# Patient Record
Sex: FEMALE | Race: WHITE | NOT HISPANIC OR LATINO | Employment: OTHER | URBAN - NONMETROPOLITAN AREA
[De-identification: names, ages, dates, MRNs, and addresses within clinical notes are randomized per-mention and may not be internally consistent; named-entity substitution may affect disease eponyms.]

---

## 2017-02-13 RX ORDER — DIGOXIN 125 MCG
TABLET ORAL
Qty: 30 TABLET | Refills: 0 | Status: SHIPPED | OUTPATIENT
Start: 2017-02-13

## 2017-03-21 ENCOUNTER — OFFICE VISIT (OUTPATIENT)
Dept: CARDIOLOGY | Facility: CLINIC | Age: 82
End: 2017-03-21

## 2017-03-21 VITALS
HEIGHT: 68 IN | BODY MASS INDEX: 20 KG/M2 | WEIGHT: 132 LBS | HEART RATE: 67 BPM | DIASTOLIC BLOOD PRESSURE: 62 MMHG | SYSTOLIC BLOOD PRESSURE: 130 MMHG

## 2017-03-21 DIAGNOSIS — I50.20 SYSTOLIC CONGESTIVE HEART FAILURE, UNSPECIFIED CONGESTIVE HEART FAILURE CHRONICITY: ICD-10-CM

## 2017-03-21 DIAGNOSIS — Z79.01 ANTICOAGULATED: ICD-10-CM

## 2017-03-21 DIAGNOSIS — I48.20 CHRONIC ATRIAL FIBRILLATION (HCC): Primary | ICD-10-CM

## 2017-03-21 PROCEDURE — 93000 ELECTROCARDIOGRAM COMPLETE: CPT | Performed by: INTERNAL MEDICINE

## 2017-03-21 PROCEDURE — 99213 OFFICE O/P EST LOW 20 MIN: CPT | Performed by: INTERNAL MEDICINE

## 2017-03-21 NOTE — PROGRESS NOTES
" Subjective:       Jeaneth Hopkins is a 89 y.o. female who here for follow up    CC  Follow up for afib  HPI  89 yr old w/f here for follow up with chronic afib, on anticoagulation has been doing well, with no cp , palpitation, syncope near syncope     Problem List Items Addressed This Visit        Cardiovascular and Mediastinum    Systolic congestive heart failure    Chronic atrial fibrillation - Primary       Other    Anticoagulated        Previous treatments/evaluations include: ASA. Cardiac risk factors: advanced age (older than 55 for men, 65 for women).    The following portions of the patient's history were reviewed and updated as appropriate: allergies, current medications, past family history, past medical history, past social history, past surgical history and problem list.    Past Medical History   Diagnosis Date   • Abnormal TSH    • Allergic rhinitis    • Anxiety    • Arthritis    • Atrial fibrillation    • Basal cell carcinoma    • DJD (degenerative joint disease)    • GERD (gastroesophageal reflux disease)    • Hearing loss    • Heart murmur    • Hiatal hernia    • HTN (hypertension)    • Pulmonary HTN     reports that she quit smoking about 80 years ago. She has never used smokeless tobacco. She reports that she does not drink alcohol or use illicit drugs.  Family History   Problem Relation Age of Onset   • Heart attack Father        Review of Systems  Constitutional: No wt loss, fever, fatigue  Gastrointestinal: No nausea, abdominal pain  Behavioral/Psych: No insomnia or anxiety   Cardiovascular no cp, palpitation  Objective:       Physical Exam             Physical Exam  Visit Vitals   • /62   • Pulse 67   • Ht 68\" (172.7 cm)   • Wt 132 lb (59.9 kg)   • BMI 20.07 kg/m2       General appearance: NAD, conversant   Eyes: anicteric sclerae, moist conjunctivae; no lid-lag; PERRLA   HENT: Atraumatic; oropharynx clear with moist mucous membranes and no mucosal ulcerations;  normal hard and soft " palate   Neck: Trachea midline; FROM, supple, no thyromegaly or lymphadenopathy   Lungs: CTA, with normal respiratory effort and no intercostal retractions   CV: S1-S2 regular, no murmurs, no rub, no gallop   Abdomen: Soft, non-tender; no masses or HSM   Extremities: No peripheral edema or extremity lymphadenopathy  Skin: Normal temperature, turgor and texture; no rash, ulcers or subcutaneous nodules   Psych: Appropriate affect, alert and oriented to person, place and time           Cardiographics  @  ECG 12 Lead  Date/Time: 3/21/2017 2:54 PM  Performed by: SELVIN COUCH  Authorized by: SELVIN COUCH   Comparison: compared with previous ECG   Similar to previous ECG  Rhythm: atrial fibrillation  ST Flattening: all  Clinical impression: abnormal ECG        Interpretation Summary   · Equivocal ECG evidence of myocardial ischemia.Negative clinical evidence of myocardial ischemia.  · Left ventricular ejection fraction is hyperdynamic (Calculated EF > 70%).  · Myocardial perfusion imaging indicates a normal myocardial perfusion study with no evidence of ischemia.  · Impressions are consistent with a low risk study.             Echocardiogram:        Current Outpatient Prescriptions:   •  ALPRAZolam (XANAX) 0.25 MG tablet, Take 0.25 mg by mouth At Night As Needed., Disp: , Rfl:   •  carvedilol (COREG) 6.25 MG tablet, Take 6.25 mg by mouth 2 (Two) Times a Day With Meals., Disp: , Rfl:   •  digoxin (LANOXIN) 125 MCG tablet, TAKE ONE TABLET BY MOUTH ONCE DAILY, Disp: 30 tablet, Rfl: 0  •  famotidine (PEPCID) 20 MG tablet, Take 1 tablet by mouth Daily., Disp: , Rfl:   •  fluticasone (FLONASE) 50 MCG/ACT nasal spray, 1 spray into each nostril Daily As Needed., Disp: , Rfl:   •  furosemide (LASIX) 20 MG tablet, Take 20 mg by mouth Daily., Disp: , Rfl:   •  mineral oil 100 % oil external liquid, Apply  topically As Needed., Disp: , Rfl:   •  nitroglycerin (NITROSTAT) 0.4 MG SL tablet, Place 1 tablet under the  tongue Every 5 (Five) Minutes As Needed for chest pain. Take no more than 3 doses in 15 minutes., Disp: 25 tablet, Rfl: 0  •  rivaroxaban (XARELTO) 20 MG tablet, Take 20 mg by mouth Daily With Dinner., Disp: , Rfl:    Assessment:        Patient Active Problem List   Diagnosis   • Paroxysmal atrial fibrillation   • Combined systolic and diastolic congestive heart failure   • New onset a-fib   • Systolic congestive heart failure   • Anticoagulated   • Chronic atrial fibrillation               Plan:       Pros and cons as well as indication of the anticoagulation has been explained to the patient in detail    There are no obvious complications at this stage    Risk of  the bleedings has been explained    Need for the regular blood workup and adjust the dose has been explained    Need for proper follow-up on anticoagulation also has been explained        ICD-10-CM ICD-9-CM   1. Chronic atrial fibrillation I48.2 427.31   2. Anticoagulated Z79.01 V58.61   3. Systolic congestive heart failure, unspecified congestive heart failure chronicity I50.20 428.20     428.0   sss stable    See us 6 months    COUNSELING:    Jeaneth Benítez was given to patient for the following topics: diagnostic results, risk factor reductions, impressions, risks and benefits of treatment options and importance of treatment compliance .       SMOKING COUNSELING:    Counseling given: Not Answered      EMR Dragon/Transcription disclaimer:   Much of this encounter note is an electronic transcription/translation of spoken language to printed text. The electronic translation of spoken language may permit erroneous, or at times, nonsensical words or phrases to be inadvertently transcribed; Although I have reviewed the note for such errors, some may still exist.

## 2017-09-26 ENCOUNTER — OFFICE VISIT (OUTPATIENT)
Dept: CARDIOLOGY | Facility: CLINIC | Age: 82
End: 2017-09-26

## 2017-09-26 VITALS
HEIGHT: 68 IN | HEART RATE: 94 BPM | SYSTOLIC BLOOD PRESSURE: 123 MMHG | WEIGHT: 124 LBS | BODY MASS INDEX: 18.79 KG/M2 | DIASTOLIC BLOOD PRESSURE: 64 MMHG

## 2017-09-26 DIAGNOSIS — I50.40 COMBINED SYSTOLIC AND DIASTOLIC CONGESTIVE HEART FAILURE, UNSPECIFIED CONGESTIVE HEART FAILURE CHRONICITY: ICD-10-CM

## 2017-09-26 DIAGNOSIS — I48.0 PAROXYSMAL ATRIAL FIBRILLATION (HCC): Primary | ICD-10-CM

## 2017-09-26 DIAGNOSIS — I50.20 SYSTOLIC CONGESTIVE HEART FAILURE, UNSPECIFIED CONGESTIVE HEART FAILURE CHRONICITY: ICD-10-CM

## 2017-09-26 DIAGNOSIS — I48.20 CHRONIC ATRIAL FIBRILLATION (HCC): ICD-10-CM

## 2017-09-26 PROCEDURE — 99213 OFFICE O/P EST LOW 20 MIN: CPT | Performed by: INTERNAL MEDICINE

## 2017-09-26 PROCEDURE — 93000 ELECTROCARDIOGRAM COMPLETE: CPT | Performed by: INTERNAL MEDICINE

## 2017-09-26 NOTE — PROGRESS NOTES
" Subjective:       Jeaneth Hopkins is a 89 y.o. female who here for follow up    CC  6 months follow-up  HPI  89-year-old white female with known history of paroxysmal atrial fibrillation congestive heart failure here for the six-month follow-up with no complaints of chest pains or tightness in chest also has a history of benign essential arterial hypertension as well as pulmonary hypertension with no chest pains or shortness of breath on moderate exertion only no different than before     Problem List Items Addressed This Visit        Cardiovascular and Mediastinum    Paroxysmal atrial fibrillation - Primary    Combined systolic and diastolic congestive heart failure    Systolic congestive heart failure    Chronic atrial fibrillation        .    The following portions of the patient's history were reviewed and updated as appropriate: allergies, current medications, past family history, past medical history, past social history, past surgical history and problem list.    Past Medical History:   Diagnosis Date   • Abnormal TSH    • Allergic rhinitis    • Anxiety    • Arthritis    • Atrial fibrillation    • Basal cell carcinoma    • DJD (degenerative joint disease)    • GERD (gastroesophageal reflux disease)    • Hearing loss    • Heart murmur    • Hiatal hernia    • HTN (hypertension)    • Pulmonary HTN     reports that she quit smoking about 80 years ago. She has never used smokeless tobacco. She reports that she does not drink alcohol or use illicit drugs.  Family History   Problem Relation Age of Onset   • Heart attack Father        Review of Systems  Constitutional: No wt loss, fever, fatigue  Gastrointestinal: No nausea, abdominal pain  Behavioral/Psych: No insomnia or anxiety   Cardiovascular Shortness of breath on moderate exertion  Objective:       Physical Exam           Physical Exam  /64  Pulse 94  Ht 68\" (172.7 cm)  Wt 124 lb (56.2 kg)  BMI 18.85 kg/m2    General appearance: NAD, conversant "   Eyes: anicteric sclerae, moist conjunctivae; no lid-lag; PERRLA   HENT: Atraumatic; oropharynx clear with moist mucous membranes and no mucosal ulcerations;  normal hard and soft palate   Neck: Trachea midline; FROM, supple, no thyromegaly or lymphadenopathy   Lungs: CTA, with normal respiratory effort and no intercostal retractions   CV: S1-S2 regular, no murmurs, no rub, no gallop   Abdomen: Soft, non-tender; no masses or HSM   Extremities: No peripheral edema or extremity lymphadenopathy  Skin: Normal temperature, turgor and texture; no rash, ulcers or subcutaneous nodules   Psych: Appropriate affect, alert and oriented to person, place and time           Cardiographics  @  ECG 12 Lead  Date/Time: 9/26/2017 3:37 PM  Performed by: SELVIN COUCH  Authorized by: SELVIN COUCH   Comparison: compared with previous ECG   Similar to previous ECG  Rhythm: atrial fibrillation  ST Flattening: all  Clinical impression: non-specific ECG            Echocardiogram:        Current Outpatient Prescriptions:   •  ALPRAZolam (XANAX) 0.25 MG tablet, Take 0.25 mg by mouth At Night As Needed., Disp: , Rfl:   •  carvedilol (COREG) 6.25 MG tablet, Take 6.25 mg by mouth 2 (Two) Times a Day With Meals., Disp: , Rfl:   •  digoxin (LANOXIN) 125 MCG tablet, TAKE ONE TABLET BY MOUTH ONCE DAILY, Disp: 30 tablet, Rfl: 0  •  furosemide (LASIX) 20 MG tablet, Take 20 mg by mouth Daily., Disp: , Rfl:   •  rivaroxaban (XARELTO) 20 MG tablet, Take 20 mg by mouth Daily With Dinner., Disp: , Rfl:   •  famotidine (PEPCID) 20 MG tablet, Take 1 tablet by mouth Daily., Disp: , Rfl:   •  fluticasone (FLONASE) 50 MCG/ACT nasal spray, 1 spray into each nostril Daily As Needed., Disp: , Rfl:   •  mineral oil 100 % oil external liquid, Apply  topically As Needed., Disp: , Rfl:   •  nitroglycerin (NITROSTAT) 0.4 MG SL tablet, Place 1 tablet under the tongue Every 5 (Five) Minutes As Needed for chest pain. Take no more than 3 doses in 15  minutes., Disp: 25 tablet, Rfl: 0   Assessment:        Patient Active Problem List   Diagnosis   • Paroxysmal atrial fibrillation   • Combined systolic and diastolic congestive heart failure   • New onset a-fib   • Systolic congestive heart failure   • Anticoagulated   • Chronic atrial fibrillation               Plan:            ICD-10-CM ICD-9-CM   1. Paroxysmal atrial fibrillation I48.0 427.31   2. Combined systolic and diastolic congestive heart failure, unspecified congestive heart failure chronicity I50.40 428.40   3. Systolic congestive heart failure, unspecified congestive heart failure chronicity I50.20 428.20     428.0   4. Chronic atrial fibrillation I48.2 427.31     1. Paroxysmal atrial fibrillation  Atrial fibrillation undercontrolled    2. Combined systolic and diastolic congestive heart failure, unspecified congestive heart failure chronicity  Well compensated    3. Systolic congestive heart failure, unspecified congestive heart failure chronicity      4. Chronic atrial fibrillation      COUNSELING:    Jeaneth Benítez was given to patient for the following topics: diagnostic results, risk factor reductions, impressions, risks and benefits of treatment options and importance of treatment compliance .       SMOKING COUNSELING:    Counseling given: Not Answered      EMR Dragon/Transcription disclaimer:   Much of this encounter note is an electronic transcription/translation of spoken language to printed text. The electronic translation of spoken language may permit erroneous, or at times, nonsensical words or phrases to be inadvertently transcribed; Although I have reviewed the note for such errors, some may still exist.

## 2017-12-06 ENCOUNTER — APPOINTMENT (OUTPATIENT)
Dept: CT IMAGING | Facility: HOSPITAL | Age: 82
End: 2017-12-06

## 2017-12-06 ENCOUNTER — HOSPITAL ENCOUNTER (EMERGENCY)
Facility: HOSPITAL | Age: 82
Discharge: HOME | End: 2017-12-07
Attending: EMERGENCY MEDICINE | Admitting: EMERGENCY MEDICINE

## 2017-12-06 DIAGNOSIS — H61.23 BILATERAL IMPACTED CERUMEN: ICD-10-CM

## 2017-12-06 DIAGNOSIS — IMO0002 POSITIONAL VERTIGO OF LEFT EAR: ICD-10-CM

## 2017-12-06 DIAGNOSIS — R11.0 NAUSEA: Primary | ICD-10-CM

## 2017-12-06 DIAGNOSIS — R53.1 GENERALIZED WEAKNESS: ICD-10-CM

## 2017-12-06 LAB
ALBUMIN SERPL-MCNC: 3.9 G/DL (ref 3.5–5.2)
ALBUMIN/GLOB SERPL: 1.3 G/DL
ALP SERPL-CCNC: 65 U/L (ref 40–129)
ALT SERPL W P-5'-P-CCNC: 12 U/L (ref 5–33)
ANION GAP SERPL CALCULATED.3IONS-SCNC: 10.8 MMOL/L
AST SERPL-CCNC: 18 U/L (ref 5–32)
BASOPHILS # BLD AUTO: 0.02 10*3/MM3 (ref 0–0.2)
BASOPHILS NFR BLD AUTO: 0.4 % (ref 0–2)
BILIRUB SERPL-MCNC: 0.4 MG/DL (ref 0.2–1.2)
BILIRUB UR QL STRIP: NEGATIVE
BUN BLD-MCNC: 33 MG/DL (ref 8–23)
BUN/CREAT SERPL: 35.5 (ref 7–25)
CALCIUM SPEC-SCNC: 8.5 MG/DL (ref 8.2–9.6)
CHLORIDE SERPL-SCNC: 103 MMOL/L (ref 98–107)
CLARITY UR: CLEAR
CO2 SERPL-SCNC: 25.2 MMOL/L (ref 22–29)
COLOR UR: YELLOW
CREAT BLD-MCNC: 0.93 MG/DL (ref 0.57–1)
DEPRECATED RDW RBC AUTO: 47.1 FL (ref 37–54)
DIGOXIN SERPL-MCNC: 1.51 NG/ML (ref 0.8–2)
EOSINOPHIL # BLD AUTO: 0.04 10*3/MM3 (ref 0.1–0.3)
EOSINOPHIL NFR BLD AUTO: 0.7 % (ref 0–4)
ERYTHROCYTE [DISTWIDTH] IN BLOOD BY AUTOMATED COUNT: 14.6 % (ref 11.5–14.5)
GFR SERPL CREATININE-BSD FRML MDRD: 57 ML/MIN/1.73
GLOBULIN UR ELPH-MCNC: 3 GM/DL
GLUCOSE BLD-MCNC: 172 MG/DL (ref 65–99)
GLUCOSE UR STRIP-MCNC: NEGATIVE MG/DL
HCT VFR BLD AUTO: 34 % (ref 37–47)
HGB BLD-MCNC: 11 G/DL (ref 12–16)
HGB UR QL STRIP.AUTO: NEGATIVE
IMM GRANULOCYTES # BLD: 0.01 10*3/MM3 (ref 0–0.03)
IMM GRANULOCYTES NFR BLD: 0.2 % (ref 0–0.5)
KETONES UR QL STRIP: NEGATIVE
LEUKOCYTE ESTERASE UR QL STRIP.AUTO: NEGATIVE
LIPASE SERPL-CCNC: 32 U/L (ref 13–60)
LYMPHOCYTES # BLD AUTO: 1.24 10*3/MM3 (ref 0.6–4.8)
LYMPHOCYTES NFR BLD AUTO: 22.6 % (ref 20–45)
MCH RBC QN AUTO: 28.8 PG (ref 27–31)
MCHC RBC AUTO-ENTMCNC: 32.4 G/DL (ref 31–37)
MCV RBC AUTO: 89 FL (ref 81–99)
MONOCYTES # BLD AUTO: 0.35 10*3/MM3 (ref 0–1)
MONOCYTES NFR BLD AUTO: 6.4 % (ref 3–8)
NEUTROPHILS # BLD AUTO: 3.83 10*3/MM3 (ref 1.5–8.3)
NEUTROPHILS NFR BLD AUTO: 69.7 % (ref 45–70)
NITRITE UR QL STRIP: NEGATIVE
NRBC BLD MANUAL-RTO: 0 /100 WBC (ref 0–0)
PH UR STRIP.AUTO: 7 [PH] (ref 4.5–8)
PLATELET # BLD AUTO: 119 10*3/MM3 (ref 140–500)
PMV BLD AUTO: 11.3 FL (ref 7.4–10.4)
POTASSIUM BLD-SCNC: 4.6 MMOL/L (ref 3.5–5.2)
PROT SERPL-MCNC: 6.9 G/DL (ref 6–8.5)
PROT UR QL STRIP: NEGATIVE
RBC # BLD AUTO: 3.82 10*6/MM3 (ref 4.2–5.4)
SODIUM BLD-SCNC: 139 MMOL/L (ref 136–145)
SP GR UR STRIP: 1.02 (ref 1–1.03)
TROPONIN T SERPL-MCNC: <0.01 NG/ML (ref 0–0.03)
UROBILINOGEN UR QL STRIP: NORMAL
WBC NRBC COR # BLD: 5.49 10*3/MM3 (ref 4.8–10.8)

## 2017-12-06 PROCEDURE — 99284 EMERGENCY DEPT VISIT MOD MDM: CPT

## 2017-12-06 PROCEDURE — 83690 ASSAY OF LIPASE: CPT | Performed by: EMERGENCY MEDICINE

## 2017-12-06 PROCEDURE — 63710000001 DEXAMETHASONE PER 0.25 MG: Performed by: EMERGENCY MEDICINE

## 2017-12-06 PROCEDURE — 93010 ELECTROCARDIOGRAM REPORT: CPT | Performed by: INTERNAL MEDICINE

## 2017-12-06 PROCEDURE — 85025 COMPLETE CBC W/AUTO DIFF WBC: CPT | Performed by: EMERGENCY MEDICINE

## 2017-12-06 PROCEDURE — 80162 ASSAY OF DIGOXIN TOTAL: CPT | Performed by: EMERGENCY MEDICINE

## 2017-12-06 PROCEDURE — 84484 ASSAY OF TROPONIN QUANT: CPT | Performed by: EMERGENCY MEDICINE

## 2017-12-06 PROCEDURE — 80053 COMPREHEN METABOLIC PANEL: CPT | Performed by: EMERGENCY MEDICINE

## 2017-12-06 PROCEDURE — 99285 EMERGENCY DEPT VISIT HI MDM: CPT | Performed by: EMERGENCY MEDICINE

## 2017-12-06 PROCEDURE — 81003 URINALYSIS AUTO W/O SCOPE: CPT | Performed by: EMERGENCY MEDICINE

## 2017-12-06 PROCEDURE — 70450 CT HEAD/BRAIN W/O DYE: CPT

## 2017-12-06 PROCEDURE — 93005 ELECTROCARDIOGRAM TRACING: CPT | Performed by: EMERGENCY MEDICINE

## 2017-12-06 RX ORDER — MECLIZINE HYDROCHLORIDE 25 MG/1
TABLET ORAL
Qty: 30 TABLET | Refills: 0 | Status: SHIPPED | OUTPATIENT
Start: 2017-12-06 | End: 2018-03-27 | Stop reason: ALTCHOICE

## 2017-12-06 RX ORDER — DEXAMETHASONE 4 MG/1
8 TABLET ORAL ONCE
Status: COMPLETED | OUTPATIENT
Start: 2017-12-06 | End: 2017-12-06

## 2017-12-06 RX ORDER — SODIUM CHLORIDE 0.9 % (FLUSH) 0.9 %
10 SYRINGE (ML) INJECTION AS NEEDED
Status: DISCONTINUED | OUTPATIENT
Start: 2017-12-06 | End: 2017-12-07 | Stop reason: HOSPADM

## 2017-12-06 RX ORDER — ONDANSETRON 4 MG/1
TABLET, ORALLY DISINTEGRATING ORAL
Qty: 20 TABLET | Refills: 0 | Status: SHIPPED | OUTPATIENT
Start: 2017-12-06 | End: 2018-03-27 | Stop reason: ALTCHOICE

## 2017-12-06 RX ORDER — SODIUM CHLORIDE 0.9 % (FLUSH) 0.9 %
10 SYRINGE (ML) INJECTION AS NEEDED
Status: DISCONTINUED | OUTPATIENT
Start: 2017-12-06 | End: 2017-12-06

## 2017-12-06 RX ADMIN — DEXAMETHASONE 8 MG: 4 TABLET ORAL at 23:54

## 2017-12-07 VITALS
WEIGHT: 127.21 LBS | BODY MASS INDEX: 21.19 KG/M2 | SYSTOLIC BLOOD PRESSURE: 141 MMHG | HEART RATE: 80 BPM | RESPIRATION RATE: 22 BRPM | DIASTOLIC BLOOD PRESSURE: 75 MMHG | TEMPERATURE: 97.7 F | OXYGEN SATURATION: 95 % | HEIGHT: 65 IN

## 2017-12-07 NOTE — ED NOTES
Sat pt up in bed to take pills.  Pt's dizziness worsened when she was moved.      Dorothea Valladares RN  12/06/17 0346

## 2017-12-07 NOTE — ED PROVIDER NOTES
Subjective   History of Present Illness   Family assist with history, especially her granddaughter.  Patient is somewhat sedated after the Phenergan by EMS.    History of Present Illness    Chief complaint: Nausea    Location: No focal pain complaint    Quality/Severity:  Moderate at onset, none now    Timing/Duration: Transient episode yesterday morning at 3 AM that resolved spontaneously; recurrence of nausea about 2 hours prior to arrival in the emergency department    Modifying Factors: Nausea significant improved after fainting in by EMS    Associated Symptoms: One episode of formed stool shortly prior to arrival at home before EMS arrival.  No vomiting.  No fever.  No dysuria.  No change in bowel habits.  Denies abdominal pain.  Denies chest pain, dyspnea and headache.    Narrative: 89-year-old female with 2 episodes of nausea.  Family reports that urine was strong smelling.  Patient reports she felt well earlier in the day today.  No recent fevers.    Review of Systems  All other systems reviewed and are otherwise negative as related to chief complaint.    Past Medical History:   Diagnosis Date   • Abnormal TSH    • Allergic rhinitis    • Anxiety    • Arthritis    • Atrial fibrillation    • Basal cell carcinoma    • DJD (degenerative joint disease)    • GERD (gastroesophageal reflux disease)    • Hearing loss    • Heart murmur    • Hiatal hernia    • HTN (hypertension)    • Pulmonary HTN        Allergies   Allergen Reactions   • Codeine    • Zyrtec [Cetirizine]        Past Surgical History:   Procedure Laterality Date   • CERVIX LESION DESTRUCTION     • CHOLECYSTECTOMY         Family History   Problem Relation Age of Onset   • Heart attack Father        Social History     Social History   • Marital status:      Spouse name: N/A   • Number of children: N/A   • Years of education: N/A     Social History Main Topics   • Smoking status: Former Smoker     Quit date: 11/15/1936   • Smokeless tobacco: Never  Used   • Alcohol use No   • Drug use: No   • Sexual activity: Defer     Other Topics Concern   • None     Social History Narrative       ED Triage Vitals   Temp Heart Rate Resp BP SpO2   12/06/17 2113 12/06/17 2113 12/06/17 2113 12/06/17 2117 12/06/17 2113   97.7 °F (36.5 °C) 93 22 185/98 95 %      Temp src Heart Rate Source Patient Position BP Location FiO2 (%)   12/06/17 2113 12/06/17 2113 12/06/17 2113 12/06/17 2113 --   Oral Monitor Lying Right arm          Objective   Physical Exam   Constitutional: She is oriented to person, place, and time. She appears well-developed. No distress.   HENT:   Head: Normocephalic and atraumatic.   Eyes: Conjunctivae are normal.   Cardiovascular: Normal rate and intact distal pulses.    Irregularly irregular   Pulmonary/Chest: Effort normal and breath sounds normal. No respiratory distress.   Abdominal: Soft. There is no tenderness. There is no rebound and no guarding.   Musculoskeletal:   Moves all 4 extremities equally but weakly   Neurological: She is alert and oriented to person, place, and time.   Slightly sedated   Skin: Skin is warm and dry.   Psychiatric: She has a normal mood and affect.   Vitals reviewed.      Procedures         ED Course  ED Course   Comment By Time   No further vomiting.  No complaint of pain.  Patient did have 400 mL of IV fluid prior to arrival in the emergency Department by EMS.  Vitals remained stable.  Reviewed labs with patient.  Her abdominal exam remains benign.  Patient remains slightly sedated from the Phenergan but is eager to be discharged.  Family agreeable with this plan. Paul Hopkins MD 12/06 2256   Pt had dizziness trying to sit up. Add CT head and decadron. Paul Hopkins MD 12/06 1100      EKG           EKG time: 2134  Rhythm/Rate: Atrial fibrillation, rate 85  P waves and TX: Not applicable  QRS, axis: Narrow   ST and T waves: No STEMI; nonspecific ST/T-wave abnormalities     Interpreted contemporaneously with treatment by me,  independently viewed  Prior tracing dated 9/26/17 reviewed-no significant change    Results for orders placed or performed during the hospital encounter of 12/06/17   Comprehensive Metabolic Panel   Result Value Ref Range    Glucose 172 (H) 65 - 99 mg/dL    BUN 33 (H) 8 - 23 mg/dL    Creatinine 0.93 0.57 - 1.00 mg/dL    Sodium 139 136 - 145 mmol/L    Potassium 4.6 3.5 - 5.2 mmol/L    Chloride 103 98 - 107 mmol/L    CO2 25.2 22.0 - 29.0 mmol/L    Calcium 8.5 8.2 - 9.6 mg/dL    Total Protein 6.9 6.0 - 8.5 g/dL    Albumin 3.90 3.50 - 5.20 g/dL    ALT (SGPT) 12 5 - 33 U/L    AST (SGOT) 18 5 - 32 U/L    Alkaline Phosphatase 65 40 - 129 U/L    Total Bilirubin 0.4 0.2 - 1.2 mg/dL    eGFR Non African Amer 57 (L) >60 mL/min/1.73    Globulin 3.0 gm/dL    A/G Ratio 1.3 g/dL    BUN/Creatinine Ratio 35.5 (H) 7.0 - 25.0    Anion Gap 10.8 mmol/L   Lipase   Result Value Ref Range    Lipase 32 13 - 60 U/L   Urinalysis With / Culture If Indicated - Urine, Catheter   Result Value Ref Range    Color, UA Yellow Yellow, Straw    Appearance, UA Clear Clear    pH, UA 7.0 4.5 - 8.0    Specific Gravity, UA 1.020 1.003 - 1.030    Glucose, UA Negative Negative    Ketones, UA Negative Negative, 80 mg/dL (3+), >=160 mg/dL (4+)    Bilirubin, UA Negative Negative    Blood, UA Negative Negative    Protein, UA Negative Negative    Leuk Esterase, UA Negative Negative    Nitrite, UA Negative Negative    Urobilinogen, UA 0.2 E.U./dL 0.2 - 1.0 E.U./dL   Troponin   Result Value Ref Range    Troponin T <0.010 0.000 - 0.030 ng/mL   Digoxin Level   Result Value Ref Range    Digoxin 1.51 0.80 - 2.00 ng/mL   CBC Auto Differential   Result Value Ref Range    WBC 5.49 4.80 - 10.80 10*3/mm3    RBC 3.82 (L) 4.20 - 5.40 10*6/mm3    Hemoglobin 11.0 (L) 12.0 - 16.0 g/dL    Hematocrit 34.0 (L) 37.0 - 47.0 %    MCV 89.0 81.0 - 99.0 fL    MCH 28.8 27.0 - 31.0 pg    MCHC 32.4 31.0 - 37.0 g/dL    RDW 14.6 (H) 11.5 - 14.5 %    RDW-SD 47.1 37.0 - 54.0 fl    MPV 11.3 (H)  7.4 - 10.4 fL    Platelets 119 (L) 140 - 500 10*3/mm3    Neutrophil % 69.7 45.0 - 70.0 %    Lymphocyte % 22.6 20.0 - 45.0 %    Monocyte % 6.4 3.0 - 8.0 %    Eosinophil % 0.7 0.0 - 4.0 %    Basophil % 0.4 0.0 - 2.0 %    Immature Grans % 0.2 0.0 - 0.5 %    Neutrophils, Absolute 3.83 1.50 - 8.30 10*3/mm3    Lymphocytes, Absolute 1.24 0.60 - 4.80 10*3/mm3    Monocytes, Absolute 0.35 0.00 - 1.00 10*3/mm3    Eosinophils, Absolute 0.04 (L) 0.10 - 0.30 10*3/mm3    Basophils, Absolute 0.02 0.00 - 0.20 10*3/mm3    Immature Grans, Absolute 0.01 0.00 - 0.03 10*3/mm3    nRBC 0.0 0.0 - 0.0 /100 WBC                 MDM    Final diagnoses:   Nausea   Generalized weakness              Medication List      New Prescriptions          ondansetron ODT 4 MG disintegrating tablet   Commonly known as:  ZOFRAN ODT   Take one tablet by mouth every 6 hours as needed for nausea and vomiting         Stop          famotidine 20 MG tablet   Commonly known as:  PEPCID       furosemide 20 MG tablet   Commonly known as:  LASIX       rivaroxaban 15 MG tablet   Commonly known as:  XARELTO           Follow-up Information     Follow up with Aj Franks MD. Schedule an appointment as soon as possible for a visit in 2 days.    Specialty:  Family Medicine    Why:  As needed, If symptoms fail to improve    Contact information:    [address] [place] [address] [phone]          Go to ER.    Why:  As needed, If symptoms worsen               Paul Hopkins MD  12/06/17 4797      Upon sitting and rotating head at the time of discharge the patient had abrupt onset vertigo.  When she remains still this resolved.  No headache or other focal neuro deficit reported.  Repeat examination:  No change in mental status, alert and appropriate.  Ear exam is limited by cerumen impactions bilaterally.  Pupils remained equal round and reactive to light.  Extraocular muscles are intact, though some mild nystagmus to left is noted.  I'm able to re-create her  symptoms by having her look to left rapidly.  Patient continues to move all 4 extremities equally and without difficulty.  Face is symmetric.    We will give her Decadron.  I do not recommend benzodiazepine or meclizine at this time given that the patient did not tolerate Phenergan well.  We will add Decadron orally and obtain a head CT.    RADIOLOGY        Study: CT head without contrast    Findings: No acute findings, chronic changes noted    Interpreted contemporaneously with treatment by radiologist, independently viewed by me    Add new prescription for meclizine.           Paul Hopkins MD  12/06/17 2496

## 2018-03-27 ENCOUNTER — OFFICE VISIT (OUTPATIENT)
Dept: CARDIOLOGY | Facility: CLINIC | Age: 83
End: 2018-03-27

## 2018-03-27 VITALS
WEIGHT: 130 LBS | DIASTOLIC BLOOD PRESSURE: 81 MMHG | HEART RATE: 66 BPM | BODY MASS INDEX: 21.63 KG/M2 | SYSTOLIC BLOOD PRESSURE: 137 MMHG

## 2018-03-27 DIAGNOSIS — R94.31 ABNORMAL ELECTROCARDIOGRAM: ICD-10-CM

## 2018-03-27 DIAGNOSIS — I48.0 PAROXYSMAL ATRIAL FIBRILLATION (HCC): ICD-10-CM

## 2018-03-27 DIAGNOSIS — M79.89 LEG SWELLING: ICD-10-CM

## 2018-03-27 DIAGNOSIS — I48.20 CHRONIC ATRIAL FIBRILLATION (HCC): Primary | ICD-10-CM

## 2018-03-27 PROCEDURE — 93000 ELECTROCARDIOGRAM COMPLETE: CPT | Performed by: INTERNAL MEDICINE

## 2018-03-27 PROCEDURE — 99213 OFFICE O/P EST LOW 20 MIN: CPT | Performed by: INTERNAL MEDICINE

## 2018-03-27 NOTE — PROGRESS NOTES
Subjective:       Jeaneth Hopkins is a 89 y.o. female who here for follow up    CC  Leg swelling    Follow up  HPI  89-year-old female here for the follow-up with atrial fibrillation complaining of the leg swelling up to the ankles usually dependent with moderate pain     Problem List Items Addressed This Visit        Cardiovascular and Mediastinum    Paroxysmal atrial fibrillation    Chronic atrial fibrillation - Primary    Relevant Orders    ECG 12 Lead    Stress Test With Myocardial Perfusion One Day    Adult Transthoracic Echo Complete W/ Cont if Necessary Per Protocol    Abnormal electrocardiogram     Relevant Orders    Stress Test With Myocardial Perfusion One Day       Other    Leg swelling      Other Visit Diagnoses    None.       .    The following portions of the patient's history were reviewed and updated as appropriate: allergies, current medications, past family history, past medical history, past social history, past surgical history and problem list.    Past Medical History:   Diagnosis Date   • Abnormal TSH    • Allergic rhinitis    • Anxiety    • Arthritis    • Atrial fibrillation    • Basal cell carcinoma    • DJD (degenerative joint disease)    • GERD (gastroesophageal reflux disease)    • Hearing loss    • Heart murmur    • Hiatal hernia    • HTN (hypertension)    • Pulmonary HTN     reports that she quit smoking about 81 years ago. She has never used smokeless tobacco. She reports that she does not drink alcohol or use drugs.  Family History   Problem Relation Age of Onset   • Heart attack Father        Review of Systems  Constitutional: No wt loss, fever, fatigue  Gastrointestinal: No nausea, abdominal pain  Behavioral/Psych: No insomnia or anxiety   Cardiovascular Leg swelling  Objective:       Physical Exam           Physical Exam  /81   Pulse 66   Wt 59 kg (130 lb)   BMI 21.63 kg/m²     General appearance: NAD, conversant   Eyes: anicteric sclerae, moist conjunctivae; no lid-lag;  PERRLA   HENT: Atraumatic; oropharynx clear with moist mucous membranes and no mucosal ulcerations;  normal hard and soft palate   Neck: Trachea midline; FROM, supple, no thyromegaly or lymphadenopathy   Lungs: CTA, with normal respiratory effort and no intercostal retractions   CV: S1-S2 regular, no murmurs, no rub, no gallop   Abdomen: Soft, non-tender; no masses or HSM   Extremities: No peripheral edema or extremity lymphadenopathy  Skin: Normal temperature, turgor and texture; no rash, ulcers or subcutaneous nodules   Psych: Appropriate affect, alert and oriented to person, place and time           Cardiographics  @  ECG 12 Lead  Date/Time: 3/27/2018 2:51 PM  Performed by: SELVIN COUCH  Authorized by: SELVIN COUCH   Comparison: compared with previous ECG   Similar to previous ECG  Rhythm: atrial fibrillation  ST Flattening: all  Clinical impression: abnormal ECG            Echocardiogram:        Current Outpatient Prescriptions:   •  ALPRAZolam (XANAX) 0.25 MG tablet, Take 0.25 mg by mouth At Night As Needed., Disp: , Rfl:   •  apixaban (ELIQUIS) 2.5 MG tablet tablet, Take 2.5 mg by mouth Every 12 (Twelve) Hours., Disp: , Rfl:   •  carvedilol (COREG) 6.25 MG tablet, Take 6.25 mg by mouth 2 (Two) Times a Day With Meals., Disp: , Rfl:   •  digoxin (LANOXIN) 125 MCG tablet, TAKE ONE TABLET BY MOUTH ONCE DAILY, Disp: 30 tablet, Rfl: 0  •  furosemide (LASIX) 20 MG tablet, Take 20 mg by mouth As Needed., Disp: , Rfl:   •  famotidine (PEPCID) 20 MG tablet, Take 1 tablet by mouth Daily., Disp: , Rfl:   •  fluticasone (FLONASE) 50 MCG/ACT nasal spray, 1 spray into each nostril Daily As Needed., Disp: , Rfl:    Assessment:        Patient Active Problem List   Diagnosis   • Paroxysmal atrial fibrillation   • Combined systolic and diastolic congestive heart failure   • New onset a-fib   • Systolic congestive heart failure   • Anticoagulated   • Chronic atrial fibrillation               Plan:       \       ICD-10-CM ICD-9-CM   1. Chronic atrial fibrillation I48.2 427.31   2. Abnormal electrocardiogram  R94.31 794.31   3. Paroxysmal atrial fibrillation I48.0 427.31   4. Leg swelling M79.89 729.81     1. Chronic atrial fibrillation  Rate controlled  - ECG 12 Lead  - Stress Test With Myocardial Perfusion One Day  - Adult Transthoracic Echo Complete W/ Cont if Necessary Per Protocol    2. Abnormal electrocardiogram   Considering the patient's symptoms as well as clinical situation and  EKG findings, along with cardiac risk factors, ischemic workup is necessary to rule out ischemic cardiomyopathy, stress induced arrhythmias, and functional capacity for diagnosis as well as prognostic consideration    Considering patient's medical condition as well as the risk factors, patient will require echocardiogram for further evaluation for the LV function, four-chamber evaluation, including the pressures, valvular function and  pericardial disease and pericardial effusion    - Stress Test With Myocardial Perfusion One Day    3. Paroxysmal atrial fibrillation  controlled    4. Leg swelling  Jeaneth Hopkins has been complaining of the leg swelling, appears dependent pedal edema, significantly contributed with a venous insufficiency.    Strong recommendations of elevating the legs as well as using support hoses, along with the control of fluids and salt restriction has been explained in details       cv stable    See us 6 month  COUNSELING:    Jeaneth HopkinsCounseling was given to patient for the following topics: diagnostic results, risk factor reductions, impressions, risks and benefits of treatment options and importance of treatment compliance .       SMOKING COUNSELING:    Counseling given: Not Answered      EMR Dragon/Transcription disclaimer:   Much of this encounter note is an electronic transcription/translation of spoken language to printed text. The electronic translation of spoken language may permit erroneous, or at times,  nonsensical words or phrases to be inadvertently transcribed; Although I have reviewed the note for such errors, some may still exist.

## 2018-03-31 PROBLEM — R94.31 ABNORMAL ELECTROCARDIOGRAM: Status: ACTIVE | Noted: 2018-03-31

## 2018-03-31 PROBLEM — M79.89 LEG SWELLING: Status: ACTIVE | Noted: 2018-03-31

## 2018-10-23 ENCOUNTER — OFFICE VISIT (OUTPATIENT)
Dept: CARDIOLOGY | Facility: CLINIC | Age: 83
End: 2018-10-23

## 2018-10-23 VITALS
HEART RATE: 89 BPM | WEIGHT: 123 LBS | SYSTOLIC BLOOD PRESSURE: 119 MMHG | DIASTOLIC BLOOD PRESSURE: 76 MMHG | HEIGHT: 64 IN | BODY MASS INDEX: 21 KG/M2

## 2018-10-23 DIAGNOSIS — I50.20 SYSTOLIC CONGESTIVE HEART FAILURE, UNSPECIFIED HF CHRONICITY (HCC): ICD-10-CM

## 2018-10-23 DIAGNOSIS — R60.9 SWELLING: ICD-10-CM

## 2018-10-23 DIAGNOSIS — R94.31 ECG ABNORMAL: ICD-10-CM

## 2018-10-23 DIAGNOSIS — I48.20 CHRONIC ATRIAL FIBRILLATION (HCC): ICD-10-CM

## 2018-10-23 DIAGNOSIS — Z79.01 ANTICOAGULATED: ICD-10-CM

## 2018-10-23 DIAGNOSIS — I48.0 PAROXYSMAL ATRIAL FIBRILLATION (HCC): ICD-10-CM

## 2018-10-23 DIAGNOSIS — I48.91 ATRIAL FIBRILLATION, UNSPECIFIED TYPE (HCC): Primary | ICD-10-CM

## 2018-10-23 PROCEDURE — 99213 OFFICE O/P EST LOW 20 MIN: CPT | Performed by: INTERNAL MEDICINE

## 2018-10-23 RX ORDER — POTASSIUM CHLORIDE 1500 MG/1
TABLET, EXTENDED RELEASE ORAL
COMMUNITY
Start: 2018-09-17

## 2018-10-23 RX ORDER — FUROSEMIDE 40 MG/1
TABLET ORAL
COMMUNITY
Start: 2018-10-17